# Patient Record
Sex: MALE | Race: WHITE | NOT HISPANIC OR LATINO | Employment: STUDENT | URBAN - METROPOLITAN AREA
[De-identification: names, ages, dates, MRNs, and addresses within clinical notes are randomized per-mention and may not be internally consistent; named-entity substitution may affect disease eponyms.]

---
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
## 2022-01-08 ENCOUNTER — HEALTH MAINTENANCE LETTER (OUTPATIENT)
Age: 22
End: 2022-01-08

## 2022-01-18 ENCOUNTER — PRE VISIT (OUTPATIENT)
Dept: ORTHOPEDICS | Facility: CLINIC | Age: 22
End: 2022-01-18

## 2022-01-20 ENCOUNTER — MYC MEDICAL ADVICE (OUTPATIENT)
Dept: DERMATOLOGY | Facility: CLINIC | Age: 22
End: 2022-01-20

## 2022-02-09 ENCOUNTER — TELEPHONE (OUTPATIENT)
Dept: DERMATOLOGY | Facility: CLINIC | Age: 22
End: 2022-02-09
Payer: COMMERCIAL

## 2022-02-17 ENCOUNTER — MYC MEDICAL ADVICE (OUTPATIENT)
Dept: DERMATOLOGY | Facility: CLINIC | Age: 22
End: 2022-02-17

## 2022-02-17 ENCOUNTER — VIRTUAL VISIT (OUTPATIENT)
Dept: DERMATOLOGY | Facility: CLINIC | Age: 22
End: 2022-02-17
Payer: COMMERCIAL

## 2022-02-17 DIAGNOSIS — L70.0 ACNE VULGARIS: ICD-10-CM

## 2022-02-17 DIAGNOSIS — Z79.899 ON ISOTRETINOIN THERAPY: Primary | ICD-10-CM

## 2022-02-17 PROCEDURE — 99214 OFFICE O/P EST MOD 30 MIN: CPT | Mod: GT

## 2022-02-17 RX ORDER — ISOTRETINOIN 40 MG/1
40 CAPSULE ORAL 2 TIMES DAILY
Qty: 60 CAPSULE | Refills: 0 | Status: SHIPPED | OUTPATIENT
Start: 2022-02-17 | End: 2022-03-17

## 2022-02-17 ASSESSMENT — PAIN SCALES - GENERAL: PAINLEVEL: NO PAIN (0)

## 2022-02-17 NOTE — PROGRESS NOTES
[facility] Dermatology Note  Encounter Date: Feb 17, 2022  [employer] and Telephone ( [phone] ). Location of teledermatologist: [facility] DERMATOLOGY CLINIC [place].  Start time: 8:10AM. End time: 8:12AM.    Dermatology Problem List:  # Acne vulgaris  - Current treatment: isotretinoin 80mg daily  - Past treatment: doxycycline 100mg BID, tretinoin 0.025% cream at bedtime, BP wash    ____________________________________________    Assessment & Plan:   # Acne vulgaris, improving  Tolerating isotretinoin 80mg. Discussion of the risks and side effects of isotretinoin including but not limited to mucocutaneous dryness, arthralgias, myalgias, depression, suicidal ideation, headache, blurred vision, GI upset, increase in liver enzymes, increase in lipids, and teratogenic effects was reviewed. Patient counseled they cannot give blood while on isotretinoin or share medication. Ipledge program consent is on file.     Prescriber: Pepe Malone MD  REMS ID: [id number]    Current dose: 80mg daily  Goal dose (220mg/kg x 84kg): 20518ya  Cumulative dose: 3600mg    - Safety labs today CBC, CMP, LP  - Patient confirmed today in iPledge by writer      Procedures Performed:    None    Follow-up: as scheduled     Staff and Resident:  Adolfo Malone MD  PGY-2 Dermatology  Pager: [phone]    Staff Physician Comments:   I evaluated any available patient photographs with the resident and I edited the assessment and plan as documented in the note. I was present on the line and participated in the entire telephone call.    Nitish Mata MD   of Dermatology  Department of Dermatology  [facility] [employer]    ____________________________________________    CC: Accutane (Will is here for a follow up for accutane, reports improvement of acne. Only noticing some headaches with the med)    HPI:Accutane  Mr. Felice Winston is a(n) 22 year old male who  presents today as a return patient for acne.   - Skin is significantly better  - Tolerating isotretinoin 80mg daily  - Was noticing some mild headache that resolved with hydration, tolerable dryness. No vision changes or nosebleeds. Denies arthralgias, depression, suicidal ideation.  - CeraVe lotion as needed, Aquaphor for lips.   - Patient is otherwise feeling well, without additional skin concerns.   - Spring break, march 6-13th, wonders about sun protection and EtOH use    Labs Reviewed:  N/A    Physical Exam:  Vitals: There were no vitals taken for this visit.  SKIN: Teledermatology photos were reviewed; image quality and interpretability: acceptable. Image date: 01/27/22.  - Scattered erythematous acneiform papules on bilateral malar cheeks and forehead admixed atophic scarring, mild xerosis of lips.   - No other lesions of concern on areas examined.                         Medications:  Current Outpatient Medications   Medication     diclofenac (VOLTAREN) 75 MG EC tablet     ISOtretinoin (ACCUTANE) 40 MG capsule     ISOtretinoin (ACCUTANE) 40 MG capsule     meloxicam (MOBIC) 15 MG tablet     tretinoin (RETIN-A) 0.025 % external cream     No current facility-administered medications for this visit.      Past Medical/Surgical History:   There is no problem list on file for this patient.    No past medical history on file.    CC F=Referred Self  No address on file on close of this encounter.

## 2022-02-17 NOTE — LETTER
Date:February 18, 2022      Provider requested that no letter be sent. Do not send.       [facility]

## 2022-02-17 NOTE — NURSING NOTE
Dermatology Rooming Note    Felice Winston's goals for this visit include:   Chief Complaint   Patient presents with     Accutane     Will is here for a follow up for accutane, reports improvement of acne. Only noticing some headaches with the med   Margaux Romero, EMT

## 2022-02-17 NOTE — LETTER
2/17/2022       RE: Felice Winston   [facility] [address]     Dear Colleague,    Thank you for referring your patient, Felice Winston, to the [facility] [place] at [facility]. Please see a copy of my visit note below.    [facility] Dermatology Note  Encounter Date: Feb 17, 2022  Store-and-Forward and Telephone ( [phone] ). Location of teledermatologist: [facility] [place].  Start time: 8:10AM. End time: 8:12AM.    Dermatology Problem List:  # Acne vulgaris  - Current treatment: isotretinoin 80mg daily  - Past treatment: doxycycline 100mg BID, tretinoin 0.025% cream at bedtime, BP wash    ____________________________________________    Assessment & Plan:   # Acne vulgaris, improving  Tolerating isotretinoin 80mg. Discussion of the risks and side effects of isotretinoin including but not limited to mucocutaneous dryness, arthralgias, myalgias, depression, suicidal ideation, headache, blurred vision, GI upset, increase in liver enzymes, increase in lipids, and teratogenic effects was reviewed. Patient counseled they cannot give blood while on isotretinoin or share medication. Ipledge program consent is on file.     Prescriber: Pepe Malone MD  REMS ID: [id number]    Current dose: 80mg daily  Goal dose (220mg/kg x 84kg): [id number]  Cumulative dose: 3600mg    - Safety labs today CBC, CMP, LP  - Patient confirmed today in iPledge by writer      Procedures Performed:    None    Follow-up: as scheduled     Staff and Resident:  Adolfo Malone MD  PGY-2 Dermatology  Pager: [phone]    Staff Physician Comments:   I evaluated any available patient photographs with the resident and I edited the assessment and plan as documented in the note. I was present on the line and participated in the entire telephone call.    Nitish Mata MD   of Dermatology  Department  of Dermatology  [facility]    ____________________________________________    CC: Accutane (Will is here for a follow up for accutane, reports improvement of acne. Only noticing some headaches with the med)    HPI:Accutane  Mr. Felice Winston is a(n) 22 year old male who presents today as a return patient for acne.   - Skin is significantly better  - Tolerating isotretinoin 80mg daily  - Was noticing some mild headache that resolved with hydration, tolerable dryness. No vision changes or nosebleeds. Denies arthralgias, depression, suicidal ideation.  - CeraVe lotion as needed, Aquaphor for lips.   - Patient is otherwise feeling well, without additional skin concerns.   - Spring break, march 6-13th, wonders about sun protection and EtOH use    Labs Reviewed:  N/A    Physical Exam:  Vitals: There were no vitals taken for this visit.  SKIN: Teledermatology photos were reviewed; image quality and interpretability: acceptable. Image date: 01/27/22.  - Scattered erythematous acneiform papules on bilateral malar cheeks and forehead admixed atophic scarring, mild xerosis of lips.   - No other lesions of concern on areas examined.                         Medications:  Current Outpatient Medications   Medication     diclofenac (VOLTAREN) 75 MG EC tablet     ISOtretinoin (ACCUTANE) 40 MG capsule     ISOtretinoin (ACCUTANE) 40 MG capsule     meloxicam (MOBIC) 15 MG tablet     tretinoin (RETIN-A) 0.025 % external cream     No current facility-administered medications for this visit.      Past Medical/Surgical History:   There is no problem list on file for this patient.    No past medical history on file.    CC F=Referred Self  No address on file on close of this encounter.      Again, thank you for allowing me to participate in the care of your patient.      Sincerely,    Pepe Malone MD

## 2022-03-16 NOTE — PROGRESS NOTES
[facility] Dermatology Note  Encounter Date: Mar 17, 2022  Store-and-Forward and Telephone ( [phone] ). Location of teledermatologist: [facility] [place].  Start time: 9:15AM. End time: 9:17AM.    Dermatology Problem List:  # Acne vulgaris  - Current treatment: isotretinoin 80mg daily  - Past treatment: doxycycline 100mg BID, tretinoin 0.025% cream at bedtime, BP wash    ____________________________________________    Assessment & Plan:   # Acne vulgaris, improving  Tolerating isotretinoin 80mg. Discussion of the risks and side effects of isotretinoin including but not limited to mucocutaneous dryness, arthralgias, myalgias, depression, suicidal ideation, headache, blurred vision, GI upset, increase in liver enzymes, increase in lipids, and teratogenic effects was reviewed. Patient counseled they cannot give blood while on isotretinoin or share medication. Ipledge program consent is on file.     Prescriber: Pepe Malone MD  REMS ID: [id number]    Current dose: 80mg daily  Goal dose (220mg/kg x 84kg): [id number]  Cumulative dose: 6000mg    - Safety labs due CBC, CMP, LP  - Patient confirmed today in iPledge by writer      Procedures Performed:    None    Follow-up: as scheduled     Staff and Resident:  Adolfo Malone MD  PGY-2 Dermatology  Pager: [phone]    Staff Physician Comments:   I evaluated any available patient photographs with the resident and I edited the assessment and plan as documented in the note. I was present on the line and participated in the entire telephone call.    Nitish Mata MD   of Dermatology  Department of Dermatology  [facility] [employer] [facility]    ____________________________________________    CC: Accutane (Felice has been on Accutane since the beginning of January and said things have been going very well so far.)    HPI:Accutane  Mr. Felice Winston is a(n) 22 year old male who presents  today as a return patient for acne.   - Skin is doing better  - Took a week off of isotretinoin while in [place], now back on   - Tolerating isotretinoin 80mg daily  - Headaches resolved, tolerable dryness. No vision changes or nosebleeds. Denies arthralgias, depression, suicidal ideation.  - CeraVe lotion as needed, Aquaphor for lips.   - Patient is otherwise feeling well, without additional skin concerns.       Labs Reviewed:  N/A        Physical Exam:  Vitals: There were no vitals taken for this visit.  SKIN: Teledermatology photos were reviewed; image quality and interpretability: acceptable. Image date: 01/27/22.  - Scattered erythematous acneiform papules on bilateral malar cheeks and forehead admixed atophic scarring, mild xerosis of lips.   - No other lesions of concern on areas examined.                       Medications:  Current Outpatient Medications   Medication     ISOtretinoin (ACCUTANE) 40 MG capsule     meloxicam (MOBIC) 15 MG tablet     diclofenac (VOLTAREN) 75 MG EC tablet     tretinoin (RETIN-A) 0.025 % external cream     No current facility-administered medications for this visit.      Past Medical/Surgical History:   There is no problem list on file for this patient.    No past medical history on file.    CC F=Referred Self  No address on file on close of this encounter.

## 2022-03-17 ENCOUNTER — VIRTUAL VISIT (OUTPATIENT)
Dept: DERMATOLOGY | Facility: CLINIC | Age: 22
End: 2022-03-17
Payer: COMMERCIAL

## 2022-03-17 DIAGNOSIS — L70.0 ACNE VULGARIS: ICD-10-CM

## 2022-03-17 DIAGNOSIS — Z79.899 ON ISOTRETINOIN THERAPY: Primary | ICD-10-CM

## 2022-03-17 PROCEDURE — 99214 OFFICE O/P EST MOD 30 MIN: CPT | Mod: TEL

## 2022-03-17 RX ORDER — ISOTRETINOIN 40 MG/1
40 CAPSULE ORAL 2 TIMES DAILY
Qty: 60 CAPSULE | Refills: 0 | Status: SHIPPED | OUTPATIENT
Start: 2022-03-17 | End: 2022-05-18

## 2022-03-17 NOTE — NURSING NOTE
Chief Complaint   Patient presents with     Accutane     Will has been on Accutane since the beginning of January and said things have been going very well so far.     Srinivas Case, Paramedic

## 2022-03-17 NOTE — PATIENT INSTRUCTIONS
Please call [phone] to schedule your lab appointment at [facility].

## 2022-03-17 NOTE — LETTER
Date:March 17, 2022      Provider requested that no letter be sent. Do not send.       [facility]

## 2022-03-17 NOTE — LETTER
3/17/2022       RE: Felice Winston  [address] [place] [address]     Dear Colleague,    Thank you for referring your patient, Felice Winston, to the [facility] [place] at [facility]. Please see a copy of my visit note below.    [facility] Dermatology Note  Encounter Date: Mar 17, 2022  Store-and-Forward and Telephone ( [phone] ). Location of teledermatologist: [facility] [place].  Start time: 9:15AM. End time: 9:17AM.    Dermatology Problem List:  # Acne vulgaris  - Current treatment: isotretinoin 80mg daily  - Past treatment: doxycycline 100mg BID, tretinoin 0.025% cream at bedtime, BP wash    ____________________________________________    Assessment & Plan:   # Acne vulgaris, improving  Tolerating isotretinoin 80mg. Discussion of the risks and side effects of isotretinoin including but not limited to mucocutaneous dryness, arthralgias, myalgias, depression, suicidal ideation, headache, blurred vision, GI upset, increase in liver enzymes, increase in lipids, and teratogenic effects was reviewed. Patient counseled they cannot give blood while on isotretinoin or share medication. Ipledge program consent is on file.     Prescriber: Pepe Malone MD  REMS ID: [id number]    Current dose: 80mg daily  Goal dose (220mg/kg x 84kg): [id number]  Cumulative dose: 6000mg    - Safety labs due CBC, CMP, LP  - Patient confirmed today in iPledge by writer      Procedures Performed:    None    Follow-up: as scheduled     Staff and Resident:  Adolfo Malone MD  PGY-2 Dermatology  Pager: [phone]    Staff Physician Comments:   I evaluated any available patient photographs with the resident and I edited the assessment and plan as documented in the note. I was present on the line and participated in the entire telephone call.    Nitish Mata MD   of  Dermatology  Department of Dermatology  [facility]    ____________________________________________    CC: Accutane (Felice has been on Accutane since the beginning of January and said things have been going very well so far.)    HPI:Accutane  Mr. Felice Winsotn is a(n) 22 year old male who presents today as a return patient for acne.   - Skin is doing better  - Took a week off of isotretinoin while in [place], now back on   - Tolerating isotretinoin 80mg daily  - Headaches resolved, tolerable dryness. No vision changes or nosebleeds. Denies arthralgias, depression, suicidal ideation.  - CeraVe lotion as needed, Aquaphor for lips.   - Patient is otherwise feeling well, without additional skin concerns.       Labs Reviewed:  N/A        Physical Exam:  Vitals: There were no vitals taken for this visit.  SKIN: Teledermatology photos were reviewed; image quality and interpretability: acceptable. Image date: 01/27/22.  - Scattered erythematous acneiform papules on bilateral malar cheeks and forehead admixed atophic scarring, mild xerosis of lips.   - No other lesions of concern on areas examined.                       Medications:  Current Outpatient Medications   Medication     ISOtretinoin (ACCUTANE) 40 MG capsule     meloxicam (MOBIC) 15 MG tablet     diclofenac (VOLTAREN) 75 MG EC tablet     tretinoin (RETIN-A) 0.025 % external cream     No current facility-administered medications for this visit.      Past Medical/Surgical History:   There is no problem list on file for this patient.    No past medical history on file.    CC F=Referred Self  No address on file on close of this encounter.      Again, thank you for allowing me to participate in the care of your patient.      Sincerely,    Pepe Malone MD

## 2022-05-03 ENCOUNTER — LAB (OUTPATIENT)
Dept: LAB | Facility: CLINIC | Age: 22
End: 2022-05-03
Payer: COMMERCIAL

## 2022-05-03 DIAGNOSIS — Z79.899 ON ISOTRETINOIN THERAPY: ICD-10-CM

## 2022-05-03 DIAGNOSIS — L70.0 ACNE VULGARIS: ICD-10-CM

## 2022-05-03 LAB
ALBUMIN SERPL-MCNC: 4.3 G/DL (ref 3.4–5)
ALP SERPL-CCNC: 82 U/L (ref 40–150)
ALT SERPL W P-5'-P-CCNC: 36 U/L (ref 0–70)
ANION GAP SERPL CALCULATED.3IONS-SCNC: 6 MMOL/L (ref 3–14)
AST SERPL W P-5'-P-CCNC: 38 U/L (ref 0–45)
BASOPHILS # BLD AUTO: 0 10E3/UL (ref 0–0.2)
BASOPHILS NFR BLD AUTO: 1 %
BILIRUB DIRECT SERPL-MCNC: <0.1 MG/DL (ref 0–0.2)
BILIRUB SERPL-MCNC: 0.5 MG/DL (ref 0.2–1.3)
BUN SERPL-MCNC: 16 MG/DL (ref 7–30)
CALCIUM SERPL-MCNC: 9.3 MG/DL (ref 8.5–10.1)
CHLORIDE BLD-SCNC: 102 MMOL/L (ref 94–109)
CHOLEST SERPL-MCNC: 239 MG/DL
CO2 SERPL-SCNC: 31 MMOL/L (ref 20–32)
CREAT SERPL-MCNC: 0.99 MG/DL (ref 0.66–1.25)
EOSINOPHIL # BLD AUTO: 0.5 10E3/UL (ref 0–0.7)
EOSINOPHIL NFR BLD AUTO: 8 %
ERYTHROCYTE [DISTWIDTH] IN BLOOD BY AUTOMATED COUNT: 12.3 % (ref 10–15)
FASTING STATUS PATIENT QL REPORTED: ABNORMAL
GFR SERPL CREATININE-BSD FRML MDRD: >90 ML/MIN/1.73M2
GLUCOSE BLD-MCNC: 94 MG/DL (ref 70–99)
HCT VFR BLD AUTO: 45 % (ref 40–53)
HDLC SERPL-MCNC: 53 MG/DL
HGB BLD-MCNC: 15 G/DL (ref 13.3–17.7)
IMM GRANULOCYTES # BLD: 0 10E3/UL
IMM GRANULOCYTES NFR BLD: 0 %
LDLC SERPL CALC-MCNC: 160 MG/DL
LYMPHOCYTES # BLD AUTO: 2.2 10E3/UL (ref 0.8–5.3)
LYMPHOCYTES NFR BLD AUTO: 32 %
MCH RBC QN AUTO: 30 PG (ref 26.5–33)
MCHC RBC AUTO-ENTMCNC: 33.3 G/DL (ref 31.5–36.5)
MCV RBC AUTO: 90 FL (ref 78–100)
MONOCYTES # BLD AUTO: 0.6 10E3/UL (ref 0–1.3)
MONOCYTES NFR BLD AUTO: 9 %
NEUTROPHILS # BLD AUTO: 3.5 10E3/UL (ref 1.6–8.3)
NEUTROPHILS NFR BLD AUTO: 50 %
NONHDLC SERPL-MCNC: 186 MG/DL
NRBC # BLD AUTO: 0 10E3/UL
NRBC BLD AUTO-RTO: 0 /100
PLATELET # BLD AUTO: 269 10E3/UL (ref 150–450)
POTASSIUM BLD-SCNC: 4.3 MMOL/L (ref 3.4–5.3)
PROT SERPL-MCNC: 8.1 G/DL (ref 6.8–8.8)
RBC # BLD AUTO: 5 10E6/UL (ref 4.4–5.9)
SODIUM SERPL-SCNC: 139 MMOL/L (ref 133–144)
TRIGL SERPL-MCNC: 128 MG/DL
WBC # BLD AUTO: 6.9 10E3/UL (ref 4–11)

## 2022-05-03 PROCEDURE — 82248 BILIRUBIN DIRECT: CPT | Performed by: PATHOLOGY

## 2022-05-03 PROCEDURE — 85025 COMPLETE CBC W/AUTO DIFF WBC: CPT | Performed by: PATHOLOGY

## 2022-05-03 PROCEDURE — 80053 COMPREHEN METABOLIC PANEL: CPT | Performed by: PATHOLOGY

## 2022-05-03 PROCEDURE — 80061 LIPID PANEL: CPT | Performed by: PATHOLOGY

## 2022-05-03 PROCEDURE — 36415 COLL VENOUS BLD VENIPUNCTURE: CPT | Performed by: PATHOLOGY

## 2022-05-11 ENCOUNTER — MYC MEDICAL ADVICE (OUTPATIENT)
Dept: DERMATOLOGY | Facility: CLINIC | Age: 22
End: 2022-05-11
Payer: COMMERCIAL

## 2022-05-18 ENCOUNTER — VIRTUAL VISIT (OUTPATIENT)
Dept: DERMATOLOGY | Facility: CLINIC | Age: 22
End: 2022-05-18
Payer: COMMERCIAL

## 2022-05-18 DIAGNOSIS — Z79.899 ON ISOTRETINOIN THERAPY: Primary | ICD-10-CM

## 2022-05-18 DIAGNOSIS — L70.0 ACNE VULGARIS: ICD-10-CM

## 2022-05-18 PROCEDURE — 99214 OFFICE O/P EST MOD 30 MIN: CPT | Mod: GQ | Performed by: PHYSICIAN ASSISTANT

## 2022-05-18 RX ORDER — ISOTRETINOIN 40 MG/1
80 CAPSULE ORAL
Qty: 60 CAPSULE | Refills: 0 | Status: SHIPPED | OUTPATIENT
Start: 2022-05-18 | End: 2022-06-22

## 2022-05-18 NOTE — LETTER
Date:May 19, 2022      Provider requested that no letter be sent. Do not send.       [facility]

## 2022-05-18 NOTE — PROGRESS NOTES
[facility] Dermatology Note  Encounter Date: May 18, 2022  Store-and-Forward and Telephone ([phone] ). Location of teledermatologist: [facility] [place].  Start time: 12:14 pm. End time: 12:20    Dermatology Problem List:  Acne vulgaris  - Current treatment: isotretinoin 80mg daily  REMS ID: [id number]  - Past treatment: doxycycline 100mg BID, tretinoin 0.025% cream at bedtime, BP wash       ____________________________________________    Assessment & Plan:     # # Acne vulgaris, improving  Tolerating isotretinoin 80mg. Discussion of the risks and side effects of isotretinoin including but not limited to mucocutaneous dryness, arthralgias, myalgias, depression, suicidal ideation, headache, blurred vision, GI upset, increase in liver enzymes, increase in lipids, and teratogenic effects was reviewed. Patient counseled they cannot give blood while on isotretinoin or share medication. Ipledge program consent is on file.     Current dose: 80mg daily  Goal dose (220mg/kg x 84kg): [id number]  Cumulative dose: 8,400mg Plan for approximately 4 more months.      - Safety labs reviewed from 5/3/22     # Sun protection was discussed and here recommended mineral sunscreens (zinc oxide and titanium dioxide) as well as sun protective clothing.     Procedures Performed:    None    Follow-up: 1 month(s) virtually (telephone with photos), or earlier for new or changing lesions    Staff:     All risks, benefits and alternatives were discussed with patient.  Patient is in agreement and understands the assessment and plan.  All questions were answered.  Sun Screen Education was given.   Return to Clinic in 1 month or sooner as needed.   Gabriella Gallegos PA-C   ____________________________________________    CC: Accutane    HPI:  Mr. Felice Winston is a(n) 22 year old male who presents today as a return patient for acne, on isotretinoin. He was last seen March 13 th when he was continued  on 80 mg of isotretinoin. He has been off of the medication for 2 months as he he missed his April appointment.  But during this time he has had only very mild acne without any significant flares.  He has a few acne on his forehead that are small but have been going away.    Denies headaches, visual changes, epistaxis, arthralgias, myalgias, abdominal pain, stool changes, hematochezia, mood changes, depression or suicidal ideations.    He has not been donating blood or sharing the medication with anybody.    Patient is otherwise feeling well, without additional skin concerns.    Labs Reviewed:  CBC RESULTS: Recent Labs   Lab Test 05/03/22  1144   WBC 6.9   RBC 5.00   HGB 15.0   HCT 45.0   MCV 90   MCH 30.0   MCHC 33.3   RDW 12.3        Last Comprehensive Metabolic Panel:  Sodium   Date Value Ref Range Status   05/03/2022 139 133 - 144 mmol/L Final     Potassium   Date Value Ref Range Status   05/03/2022 4.3 3.4 - 5.3 mmol/L Final     Chloride   Date Value Ref Range Status   05/03/2022 102 94 - 109 mmol/L Final     Carbon Dioxide (CO2)   Date Value Ref Range Status   05/03/2022 31 20 - 32 mmol/L Final     Anion Gap   Date Value Ref Range Status   05/03/2022 6 3 - 14 mmol/L Final     Glucose   Date Value Ref Range Status   05/03/2022 94 70 - 99 mg/dL Final     Urea Nitrogen   Date Value Ref Range Status   05/03/2022 16 7 - 30 mg/dL Final     Creatinine   Date Value Ref Range Status   05/03/2022 0.99 0.66 - 1.25 mg/dL Final     GFR Estimate   Date Value Ref Range Status   05/03/2022 >90 >60 mL/min/1.73m2 Final     Comment:     Effective December 21, 2021 eGFRcr in adults is calculated using the 2021 CKD-EPI creatinine equation which includes age and gender (Lazaro et al., NEJ, DOI: 10.1056/GWZFgy3059236)     Calcium   Date Value Ref Range Status   05/03/2022 9.3 8.5 - 10.1 mg/dL Final     Bilirubin Total   Date Value Ref Range Status   05/03/2022 0.5 0.2 - 1.3 mg/dL Final     Alkaline Phosphatase   Date Value  Ref Range Status   05/03/2022 82 40 - 150 U/L Final     ALT   Date Value Ref Range Status   05/03/2022 36 0 - 70 U/L Final     AST   Date Value Ref Range Status   05/03/2022 38 0 - 45 U/L Final       Recent Labs   Lab Test 05/03/22  1144 12/16/21  0956   CHOL 239* 220*   HDL 53 57   * 129*   TRIG 128 170*             Physical Exam:  Vitals: There were no vitals taken for this visit.  SKIN: Teledermatology photos were reviewed; image quality and interpretability: acceptable. Image date: 5/11/22  -There are a few resolving papules on the forehead.  - No other lesions of concern on areas examined.                     Medications:  Current Outpatient Medications   Medication     ISOtretinoin (ACCUTANE) 40 MG capsule     meloxicam (MOBIC) 15 MG tablet     diclofenac (VOLTAREN) 75 MG EC tablet     No current facility-administered medications for this visit.      Past Medical/Surgical History:   There is no problem list on file for this patient.    No past medical history on file.    CC F=Referred Self  No address on file on close of this encounter.

## 2022-05-18 NOTE — NURSING NOTE
Chief Complaint   Patient presents with     Accutane     Teledermatology Nurse Call Patients:     Are you in the [facility] at the time of the encounter? yes    Today's visit will be billed to you and your insurance.    A teledermatology visit is not as thorough as an in-person visit and the quality of the photograph sent may not be of the same quality as that taken by the dermatology clinic.

## 2022-05-18 NOTE — LETTER
5/18/2022       RE: Felice KENNEDY Tish  [address] [place] [address]     Dear Colleague,    Thank you for referring your patient, Felice Winston, to the [facility]ERMATOLOG[facility] [place] at [facility]. Please see a copy of my visit note below.    [facility] Dermatology Note  Encounter Date: May 18, 2022  Store-and-Forward and Telephone ([phone] ). Location of teledermatologist: [facility] [place].  Start time: 12:14 pm. End time: 12:20    Dermatology Problem List:  Acne vulgaris  - Current treatment: isotretinoin 80mg daily  REMS ID: [id number]  - Past treatment: doxycycline 100mg BID, tretinoin 0.025% cream at bedtime, BP wash       ____________________________________________    Assessment & Plan:     # # Acne vulgaris, improving  Tolerating isotretinoin 80mg. Discussion of the risks and side effects of isotretinoin including but not limited to mucocutaneous dryness, arthralgias, myalgias, depression, suicidal ideation, headache, blurred vision, GI upset, increase in liver enzymes, increase in lipids, and teratogenic effects was reviewed. Patient counseled they cannot give blood while on isotretinoin or share medication. Ipledge program consent is on file.     Current dose: 80mg daily  Goal dose (220mg/kg x 84kg): [id number]  Cumulative dose: 8,400mg Plan for approximately 4 more months.      - Safety labs reviewed from 5/3/22     # Sun protection was discussed and here recommended mineral sunscreens (zinc oxide and titanium dioxide) as well as sun protective clothing.     Procedures Performed:    None    Follow-up: 1 month(s) virtually (telephone with photos), or earlier for new or changing lesions    Staff:     All risks, benefits and alternatives were discussed with patient.  Patient is in agreement and understands the assessment and plan.  All questions were answered.  Sun Screen Education was  given.   Return to Clinic in 1 month or sooner as needed.   Gabriella Gallegos PA-C   ____________________________________________    CC: Accutane    HPI:  Mr. Felice Winston is a(n) 22 year old male who presents today as a return patient for acne, on isotretinoin. He was last seen March 13 th when he was continued on 80 mg of isotretinoin. He has been off of the medication for 2 months as he he missed his April appointment.  But during this time he has had only very mild acne without any significant flares.  He has a few acne on his forehead that are small but have been going away.    Denies headaches, visual changes, epistaxis, arthralgias, myalgias, abdominal pain, stool changes, hematochezia, mood changes, depression or suicidal ideations.    He has not been donating blood or sharing the medication with anybody.    Patient is otherwise feeling well, without additional skin concerns.    Labs Reviewed:  CBC RESULTS: Recent Labs   Lab Test 05/03/22  1144   WBC 6.9   RBC 5.00   HGB 15.0   HCT 45.0   MCV 90   MCH 30.0   MCHC 33.3   RDW 12.3        Last Comprehensive Metabolic Panel:  Sodium   Date Value Ref Range Status   05/03/2022 139 133 - 144 mmol/L Final     Potassium   Date Value Ref Range Status   05/03/2022 4.3 3.4 - 5.3 mmol/L Final     Chloride   Date Value Ref Range Status   05/03/2022 102 94 - 109 mmol/L Final     Carbon Dioxide (CO2)   Date Value Ref Range Status   05/03/2022 31 20 - 32 mmol/L Final     Anion Gap   Date Value Ref Range Status   05/03/2022 6 3 - 14 mmol/L Final     Glucose   Date Value Ref Range Status   05/03/2022 94 70 - 99 mg/dL Final     Urea Nitrogen   Date Value Ref Range Status   05/03/2022 16 7 - 30 mg/dL Final     Creatinine   Date Value Ref Range Status   05/03/2022 0.99 0.66 - 1.25 mg/dL Final     GFR Estimate   Date Value Ref Range Status   05/03/2022 >90 >60 mL/min/1.73m2 Final     Comment:     Effective December 21, 2021 eGFRcr in adults is calculated using the 2021  CKD-EPI creatinine equation which includes age and gender (Lazaro et al., NEJM, DOI: 10.1056/WJXZsf7727877)     Calcium   Date Value Ref Range Status   05/03/2022 9.3 8.5 - 10.1 mg/dL Final     Bilirubin Total   Date Value Ref Range Status   05/03/2022 0.5 0.2 - 1.3 mg/dL Final     Alkaline Phosphatase   Date Value Ref Range Status   05/03/2022 82 40 - 150 U/L Final     ALT   Date Value Ref Range Status   05/03/2022 36 0 - 70 U/L Final     AST   Date Value Ref Range Status   05/03/2022 38 0 - 45 U/L Final       Recent Labs   Lab Test 05/03/22  1144 12/16/21  0956   CHOL 239* 220*   HDL 53 57   * 129*   TRIG 128 170*             Physical Exam:  Vitals: There were no vitals taken for this visit.  SKIN: Teledermatology photos were reviewed; image quality and interpretability: acceptable. Image date: 5/11/22  -There are a few resolving papules on the forehead.  - No other lesions of concern on areas examined.                     Medications:  Current Outpatient Medications   Medication     ISOtretinoin (ACCUTANE) 40 MG capsule     meloxicam (MOBIC) 15 MG tablet     diclofenac (VOLTAREN) 75 MG EC tablet     No current facility-administered medications for this visit.      Past Medical/Surgical History:   There is no problem list on file for this patient.    No past medical history on file.    CC F=Referred Self  No address on file on close of this encounter.      Again, thank you for allowing me to participate in the care of your patient.      Sincerely,    Gabriella Gallegos PA-C

## 2022-06-22 ENCOUNTER — VIRTUAL VISIT (OUTPATIENT)
Dept: DERMATOLOGY | Facility: CLINIC | Age: 22
End: 2022-06-22
Payer: COMMERCIAL

## 2022-06-22 DIAGNOSIS — L70.0 ACNE VULGARIS: ICD-10-CM

## 2022-06-22 DIAGNOSIS — Z79.899 ON ISOTRETINOIN THERAPY: ICD-10-CM

## 2022-06-22 PROCEDURE — 99214 OFFICE O/P EST MOD 30 MIN: CPT | Performed by: PHYSICIAN ASSISTANT

## 2022-06-22 RX ORDER — ISOTRETINOIN 40 MG/1
80 CAPSULE ORAL
Qty: 60 CAPSULE | Refills: 0 | Status: SHIPPED | OUTPATIENT
Start: 2022-06-22 | End: 2022-08-18

## 2022-06-22 NOTE — PROGRESS NOTES
[facility] Dermatology Note  Encounter Date: Jun 22, 2022  Store-and-Forward and Telephone ([phone]). Location of teledermatologist: [facility] DERMATOLOGY [facility]INIC [place].  Start time: ***. End time: ***.    Dermatology Problem List:  1. ***    ____________________________________________    Assessment & Plan:     # {Diagnosesderm:005956}.   {kkplans:996162}   - ***     # {Diagnosesderm:292360}.   {kkplans:782608}   - ***     Procedures Performed:    None    Follow-up: {kkfollowup:798072}    {kkstaffinvolved:986905}    ***  ____________________________________________    CC: Follow Up (Will, is here for a follow up appt)    HPI:  Mr. Felice Winston is a(n) 22 year old male who presents today {kknew/return:043535} for ***    Patient is otherwise feeling well, without additional skin concerns.    Labs Reviewed:  ***N/A    Physical Exam:  Vitals: There were no vitals taken for this visit.  SKIN: Teledermatology photos were reviewed; image quality and interpretability: ***acceptable. Image date: ***.  - ***  - No other lesions of concern on areas examined.     Medications:  Current Outpatient Medications   Medication     ISOtretinoin (ACCUTANE) 40 MG capsule     meloxicam (MOBIC) 15 MG tablet     diclofenac (VOLTAREN) 75 MG EC tablet     No current facility-administered medications for this visit.      Past Medical/Surgical History:   There is no problem list on file for this patient.    No past medical history on file.    CC Referred Self, MD  No address on file on close of this encounter.

## 2022-06-22 NOTE — PROGRESS NOTES
[facility] Dermatology Note  Encounter Date: Jun 22, 2022  Store-and-Forward and Telephone ([phone] ). Location of teledermatologist: [facility]ERMATOLOG[facility] [place].  Start time: 12:02 pm. End time:12:07    Dermatology Problem List:  Acne vulgaris  - Current treatment: isotretinoin 80mg daily  REMS ID: [id number]  - Past treatment: doxycycline 100mg BID, tretinoin 0.025% cream at bedtime, BP wash       ____________________________________________    Assessment & Plan:     # # Acne vulgaris, improving on isotretinoin.   Tolerating isotretinoin 80mg. Discussion of the risks and side effects of isotretinoin including but not limited to mucocutaneous dryness, arthralgias, myalgias, depression, suicidal ideation, headache, blurred vision, GI upset, increase in liver enzymes, increase in lipids, and teratogenic effects was reviewed. Patient counseled they cannot give blood while on isotretinoin or share medication. Ipledge program consent is on file.     Current dose: 80mg daily  Goal dose (220mg/kg x 84kg): 79629mo  Cumulative dose: 10,800 Plan for approximately 3 more months.      - Safety labs reviewed from 5/3/22     # Sun protection was discussed and here recommended mineral sunscreens (zinc oxide and titanium dioxide) as well as sun protective clothing.     Procedures Performed:    None    Follow-up: 1 month(s) virtually (telephone with photos), or earlier for new or changing lesions    Staff:     All risks, benefits and alternatives were discussed with patient.  Patient is in agreement and understands the assessment and plan.  All questions were answered.  Sun Screen Education was given.   Return to Clinic in 1 month or sooner as needed.   Gabriella Gallegos PA-C   ____________________________________________    CC: Follow Up (Will, is here for a follow up appt)    HPI:  Mr. Felice Winston is a(n) 22 year old male who presents today as a return patient for acne, on  isotretinoin. He was last seen by me on 5/18/22 when he was continued on 80 mg of isotretinoin.  He reports noting very minimal acne.  He is very happy with the improvement in his skin.  He is tolerating this well without known any notable side effects.  He has been diligent about applying sunscreen when he is outside.      Denies headaches, visual changes, epistaxis, arthralgias, myalgias, abdominal pain, stool changes, hematochezia, mood changes, depression or suicidal ideations.    He has not been donating blood or sharing the medication with anybody.    Patient is otherwise feeling well, without additional skin concerns.    Labs Reviewed:  CBC RESULTS: Recent Labs   Lab Test 05/03/22  1144   WBC 6.9   RBC 5.00   HGB 15.0   HCT 45.0   MCV 90   MCH 30.0   MCHC 33.3   RDW 12.3        Last Comprehensive Metabolic Panel:  Sodium   Date Value Ref Range Status   05/03/2022 139 133 - 144 mmol/L Final     Potassium   Date Value Ref Range Status   05/03/2022 4.3 3.4 - 5.3 mmol/L Final     Chloride   Date Value Ref Range Status   05/03/2022 102 94 - 109 mmol/L Final     Carbon Dioxide (CO2)   Date Value Ref Range Status   05/03/2022 31 20 - 32 mmol/L Final     Anion Gap   Date Value Ref Range Status   05/03/2022 6 3 - 14 mmol/L Final     Glucose   Date Value Ref Range Status   05/03/2022 94 70 - 99 mg/dL Final     Urea Nitrogen   Date Value Ref Range Status   05/03/2022 16 7 - 30 mg/dL Final     Creatinine   Date Value Ref Range Status   05/03/2022 0.99 0.66 - 1.25 mg/dL Final     GFR Estimate   Date Value Ref Range Status   05/03/2022 >90 >60 mL/min/1.73m2 Final     Comment:     Effective December 21, 2021 eGFRcr in adults is calculated using the 2021 CKD-EPI creatinine equation which includes age and gender (Lazaro bhatia al., NEJM, DOI: 10.1056/OGAMwr5462328)     Calcium   Date Value Ref Range Status   05/03/2022 9.3 8.5 - 10.1 mg/dL Final     Bilirubin Total   Date Value Ref Range Status   05/03/2022 0.5 0.2 - 1.3  mg/dL Final     Alkaline Phosphatase   Date Value Ref Range Status   05/03/2022 82 40 - 150 U/L Final     ALT   Date Value Ref Range Status   05/03/2022 36 0 - 70 U/L Final     AST   Date Value Ref Range Status   05/03/2022 38 0 - 45 U/L Final       Recent Labs   Lab Test 05/03/22  1144 12/16/21  0956   CHOL 239* 220*   HDL 53 57   * 129*   TRIG 128 170*             Physical Exam:  Vitals: There were no vitals taken for this visit.  SKIN: Teledermatology photos were reviewed; image quality and interpretability: acceptable. Image date: 6/22/22  -There is a small papule on the left forehead  - No other lesions of concern on areas examined.                         Medications:  Current Outpatient Medications   Medication     ISOtretinoin (ACCUTANE) 40 MG capsule     meloxicam (MOBIC) 15 MG tablet     diclofenac (VOLTAREN) 75 MG EC tablet     No current facility-administered medications for this visit.      Past Medical/Surgical History:   There is no problem list on file for this patient.    No past medical history on file.    CC F=Referred Self  No address on file on close of this encounter.

## 2022-06-22 NOTE — LETTER
6/22/2022       RE: Felice KENNEDY Tish  [address] [place] [address]     Dear Colleague,    Thank you for referring your patient, Felice Winston, to the [facility] DERMATOLOGY CLINIC [place] at [facility]. Please see a copy of my visit note below.    [facility] Dermatology Note  Encounter Date: Jun 22, 2022  Store-and-Forward and Telephone ([phone] ). Location of teledermatologist: [facility] DERMATOLOGY CL[facility]IC [place].  Start time: 12:02 pm. End time:12:07    Dermatology Problem List:  Acne vulgaris  - Current treatment: isotretinoin 80mg daily  REMS ID: [id number]  - Past treatment: doxycycline 100mg BID, tretinoin 0.025% cream at bedtime, BP wash       ____________________________________________    Assessment & Plan:     # # Acne vulgaris, improving on isotretinoin.   Tolerating isotretinoin 80mg. Discussion of the risks and side effects of isotretinoin including but not limited to mucocutaneous dryness, arthralgias, myalgias, depression, suicidal ideation, headache, blurred vision, GI upset, increase in liver enzymes, increase in lipids, and teratogenic effects was reviewed. Patient counseled they cannot give blood while on isotretinoin or share medication. Ipledge program consent is on file.     Current dose: 80mg daily  Goal dose (220mg/kg x 84kg): 75065on  Cumulative dose: 10,800 Plan for approximately 3 more months.      - Safety labs reviewed from 5/3/22     # Sun protection was discussed and here recommended mineral sunscreens (zinc oxide and titanium dioxide) as well as sun protective clothing.     Procedures Performed:    None    Follow-up: 1 month(s) virtually (telephone with photos), or earlier for new or changing lesions    Staff:     All risks, benefits and alternatives were discussed with patient.  Patient is in agreement and understands the assessment and plan.  All questions were answered.  Sun  Screen Education was given.   Return to Clinic in 1 month or sooner as needed.   Gabriella Gallegos PA-C   ____________________________________________    CC: Follow Up (Felice, is here for a follow up appt)    HPI:  Mr. Felice Winston is a(n) 22 year old male who presents today as a return patient for acne, on isotretinoin. He was last seen by me on 5/18/22 when he was continued on 80 mg of isotretinoin.  He reports noting very minimal acne.  He is very happy with the improvement in his skin.  He is tolerating this well without known any notable side effects.  He has been diligent about applying sunscreen when he is outside.      Denies headaches, visual changes, epistaxis, arthralgias, myalgias, abdominal pain, stool changes, hematochezia, mood changes, depression or suicidal ideations.    He has not been donating blood or sharing the medication with anybody.    Patient is otherwise feeling well, without additional skin concerns.    Labs Reviewed:  CBC RESULTS: Recent Labs   Lab Test 05/03/22  1144   WBC 6.9   RBC 5.00   HGB 15.0   HCT 45.0   MCV 90   MCH 30.0   MCHC 33.3   RDW 12.3        Last Comprehensive Metabolic Panel:  Sodium   Date Value Ref Range Status   05/03/2022 139 133 - 144 mmol/L Final     Potassium   Date Value Ref Range Status   05/03/2022 4.3 3.4 - 5.3 mmol/L Final     Chloride   Date Value Ref Range Status   05/03/2022 102 94 - 109 mmol/L Final     Carbon Dioxide (CO2)   Date Value Ref Range Status   05/03/2022 31 20 - 32 mmol/L Final     Anion Gap   Date Value Ref Range Status   05/03/2022 6 3 - 14 mmol/L Final     Glucose   Date Value Ref Range Status   05/03/2022 94 70 - 99 mg/dL Final     Urea Nitrogen   Date Value Ref Range Status   05/03/2022 16 7 - 30 mg/dL Final     Creatinine   Date Value Ref Range Status   05/03/2022 0.99 0.66 - 1.25 mg/dL Final     GFR Estimate   Date Value Ref Range Status   05/03/2022 >90 >60 mL/min/1.73m2 Final     Comment:     Effective December 21, 2021 eGFRcr  in adults is calculated using the 2021 CKD-EPI creatinine equation which includes age and gender (Lazaro et al., NE, DOI: 10.1056/KOJKrj6580740)     Calcium   Date Value Ref Range Status   05/03/2022 9.3 8.5 - 10.1 mg/dL Final     Bilirubin Total   Date Value Ref Range Status   05/03/2022 0.5 0.2 - 1.3 mg/dL Final     Alkaline Phosphatase   Date Value Ref Range Status   05/03/2022 82 40 - 150 U/L Final     ALT   Date Value Ref Range Status   05/03/2022 36 0 - 70 U/L Final     AST   Date Value Ref Range Status   05/03/2022 38 0 - 45 U/L Final       Recent Labs   Lab Test 05/03/22  1144 12/16/21  0956   CHOL 239* 220*   HDL 53 57   * 129*   TRIG 128 170*             Physical Exam:  Vitals: There were no vitals taken for this visit.  SKIN: Teledermatology photos were reviewed; image quality and interpretability: acceptable. Image date: 6/22/22  -There is a small papule on the left forehead  - No other lesions of concern on areas examined.                         Medications:  Current Outpatient Medications   Medication     ISOtretinoin (ACCUTANE) 40 MG capsule     meloxicam (MOBIC) 15 MG tablet     diclofenac (VOLTAREN) 75 MG EC tablet     No current facility-administered medications for this visit.      Past Medical/Surgical History:   There is no problem list on file for this patient.    No past medical history on file.    CC F=Referred Self  No address on file on close of this encounter.      Again, thank you for allowing me to participate in the care of your patient.      Sincerely,    Gabriella Gallegos PA-C

## 2022-06-22 NOTE — NURSING NOTE
Chief Complaint   Patient presents with     Follow Up     Will, is here for a follow up appt    Teledermatology Nurse Call Patients:     Are you in the [facility] at the time of the encounter? yes    Today's visit will be billed to you and your insurance.    A teledermatology visit is not as thorough as an in-person visit and the quality of the photograph sent may not be of the same quality as that taken by the dermatology clinic.  Rip Espinoza VF

## 2022-06-22 NOTE — LETTER
Date:June 22, 2022      Patient was self referred, no letter generated. Do not send.        [facility] Health Information

## 2022-08-12 DIAGNOSIS — L70.0 ACNE VULGARIS: ICD-10-CM

## 2022-08-12 DIAGNOSIS — Z79.899 ON ISOTRETINOIN THERAPY: ICD-10-CM

## 2022-08-16 ENCOUNTER — MYC MEDICAL ADVICE (OUTPATIENT)
Dept: DERMATOLOGY | Facility: CLINIC | Age: 22
End: 2022-08-16

## 2022-08-16 NOTE — TELEPHONE ENCOUNTER
Taz Rojas,  So, did he not  the script from my visit with him in June?  When was his last fill?    Thanks, Gabriella

## 2022-08-17 RX ORDER — ISOTRETINOIN 40 MG/1
80 CAPSULE ORAL
OUTPATIENT
Start: 2022-08-17

## 2022-08-17 NOTE — TELEPHONE ENCOUNTER
Patient scheduled for 8/18/2022 with No Macias PA-C for a virtual Accutane follow-up. Patient states he will send in photos prior to his appointment. States he had no other questions or concerns.     Bob Woo, EMT

## 2022-08-17 NOTE — TELEPHONE ENCOUNTER
Taz Rojas,  Thanks for the clarifications! You can certainly see if he can see Dr Malone earlier (or any other provider!) than his scheduled appointment with me 8/29. Otherwise he needs to be seen for refills.  Thanks, Gabriella

## 2022-08-17 NOTE — TELEPHONE ENCOUNTER
Zenatane 40 MG Oral Capsule  Last Written Prescription Date:   6/22/2022  Last Fill Quantity: 60,   # refills: 0  Last Office Visit :  6/22/2022  Future Office visit:   8/29/2022    Routing refill request to provider for review/approval because:  Drug not on the [facility], [facility] or [facility] refill protocol or controlled substance      Lexi Duque RN  Central Triage Red Flags/Med Refills

## 2022-08-18 ENCOUNTER — VIRTUAL VISIT (OUTPATIENT)
Dept: DERMATOLOGY | Facility: CLINIC | Age: 22
End: 2022-08-18
Payer: COMMERCIAL

## 2022-08-18 DIAGNOSIS — Z79.899 ON ISOTRETINOIN THERAPY: ICD-10-CM

## 2022-08-18 DIAGNOSIS — L70.0 ACNE VULGARIS: ICD-10-CM

## 2022-08-18 PROCEDURE — 99214 OFFICE O/P EST MOD 30 MIN: CPT | Mod: TEL | Performed by: PHYSICIAN ASSISTANT

## 2022-08-18 RX ORDER — ISOTRETINOIN 40 MG/1
80 CAPSULE ORAL
Qty: 60 CAPSULE | Refills: 0 | Status: SHIPPED | OUTPATIENT
Start: 2022-08-18 | End: 2022-09-26

## 2022-08-18 NOTE — LETTER
Date:August 19, 2022      Provider requested that no letter be sent. Do not send.       [employer]

## 2022-08-18 NOTE — NURSING NOTE
Dermatology Rooming Note    Felice Winston's goals for this visit include:   Chief Complaint   Patient presents with     Accutane     Will has a telehealth appointment to discuss his Accutane.  Says it's going great.  No current break outs.       Helen Reza, CMA

## 2022-08-18 NOTE — LETTER
8/18/2022       RE: Felice KENNEDY Tish  [address] [place] [address]     Dear Colleague,    Thank you for referring your patient, Felice Winston, to the [facility] DERMATOLOGY CLINIC [place] at [facility]. Please see a copy of my visit note below.    [facility] Dermatology Note  Encounter Date: Aug 18, 2022  Telephone ([phone]). Location of teledermatologist: [facility] DERMATOLOGY CLINIC [place]. Length of call: 6min    Dermatology Problem List:  1. Acne vulgaris  - Current treatment: isotretinoin 80mg daily  REMS ID: [id number]  - Past treatment: doxycycline 100mg BID, tretinoin 0.025% cream at bedtime, BP wash  ____________________________________________    Assessment & Plan:     # Acne vulgaris, improving on isotretinoin.   Tolerating isotretinoin 80mg. Discussion of the risks and side effects of isotretinoin including but not limited to mucocutaneous dryness, arthralgias, myalgias, depression, suicidal ideation, headache, blurred vision, GI upset, increase in liver enzymes, increase in lipids, and teratogenic effects was reviewed. Patient counseled they cannot give blood while on isotretinoin or share medication. Ipledge program consent is on file.     Current dose: 80mg daily  Goal dose (220mg/kg x 84kg): 97851my  Cumulative dose: 13,200mg. Plan for approximately 2 more months.      - Safety labs reviewed from 5/3/22     # Sun protection was discussed and here recommended mineral sunscreens (zinc oxide and titanium dioxide) as well as sun protective clothing.     Procedures Performed:    None    Follow-up: 1 month(s) virtually (telephone with photos), or earlier for new or changing lesions    Staff and Scribe:     Scribe Disclosure:  Valdemar EDWARDS, am serving as a scribe to document services personally performed by No Macias PA-C based on data collection and the provider's statements to me.   Provider  Disclosure:   The documentation recorded by the scribe accurately reflects the services I personally performed and the decisions made by me.    All risks, benefits and alternatives were discussed with patient.  Patient is in agreement and understands the assessment and plan.  All questions were answered.    No Macias PA-C, [facility]  [facility]: Phone: [phone], Fax: [phone]  [facility]: Phone: [phone],  Fax: [phone]  [facility]: Phone: [phone], Fax: [phone]    ____________________________________________    CC: No chief complaint on file.    HPI:  Mr. Felice Winston is a(n) 22 year old male who presents today as a return patient for isotretinoin follow up. Last seen virtually by Gabriella Gallegos PA-C, on 6/22/22, at which time patient was continued on isotretinoin 80 mg daily for treatment of acne vulgaris.    Today, he notes things are going really well. He spaced out his medication because he was gone for a while this summer. He last took a pill about 10-14 days ago. Notes no new break outs. The patient reports tolerable mucocutaneous dryness, and denies arthralgias, myalgias, depression, suicidal ideation, diarrhea, headache, or blurred vision.      Patient is otherwise feeling well, without additional skin concerns.    Labs Reviewed:  Reviewed safety labs from 5/3/22    Physical Exam:  Vitals: There were no vitals taken for this visit.  SKIN: Teledermatology photos were reviewed; image quality and interpretability: acceptable. Image date:8/16/22 .  - skin is clear, no active comedones or papules  - No other lesions of concern on areas examined.     Medications:  Current Outpatient Medications   Medication     diclofenac (VOLTAREN) 75 MG EC tablet     ISOtretinoin (ACCUTANE) 40 MG capsule     meloxicam (MOBIC) 15 MG tablet     No current facility-administered medications  for this visit.      Past Medical/Surgical History:   There is no problem list on file for this patient.    No past medical history on file.        Again, thank you for allowing me to participate in the care of your patient.      Sincerely,    No Macias PA-C

## 2022-08-18 NOTE — PROGRESS NOTES
[facility] Dermatology Note  Encounter Date: Aug 18, 2022  Telephone ([phone]). Location of teledermatologist: [facility] DERMATOLOGY CLINIC [place]. Length of call: 6min    Dermatology Problem List:  1. Acne vulgaris  - Current treatment: isotretinoin 80mg daily  REMS ID: [id number]  - Past treatment: doxycycline 100mg BID, tretinoin 0.025% cream at bedtime, BP wash  ____________________________________________    Assessment & Plan:     # Acne vulgaris, improving on isotretinoin.   Tolerating isotretinoin 80mg. Discussion of the risks and side effects of isotretinoin including but not limited to mucocutaneous dryness, arthralgias, myalgias, depression, suicidal ideation, headache, blurred vision, GI upset, increase in liver enzymes, increase in lipids, and teratogenic effects was reviewed. Patient counseled they cannot give blood while on isotretinoin or share medication. Ipledge program consent is on file.     Current dose: 80mg daily  Goal dose (220mg/kg x 84kg): 65844se  Cumulative dose: 13,200mg. Plan for approximately 2 more months.      - Safety labs reviewed from 5/3/22     # Sun protection was discussed and here recommended mineral sunscreens (zinc oxide and titanium dioxide) as well as sun protective clothing.     Procedures Performed:    None    Follow-up: 1 month(s) virtually (telephone with photos), or earlier for new or changing lesions    Staff and Scribe:     Scribe Disclosure:  I, Valdemar Nichole, am serving as a scribe to document services personally performed by No Macias PA-C based on data collection and the provider's statements to me.   Provider Disclosure:   The documentation recorded by the scribe accurately reflects the services I personally performed and the decisions made by me.    All risks, benefits and alternatives were discussed with patient.  Patient is in agreement and understands the assessment and plan.  All questions were answered.    No  CJ Macias, [facility]  [facility]: Phone: [phone], Fax: [phone]  [facility]: Phone: [phone],  Fax: [phone]  [facility]: Phone: [phone], Fax: [phone]    ____________________________________________    CC: No chief complaint on file.    HPI:  Mr. Felice Winston is a(n) 22 year old male who presents today as a return patient for isotretinoin follow up. Last seen virtually by Gabriella Gallegos PA-C, on 6/22/22, at which time patient was continued on isotretinoin 80 mg daily for treatment of acne vulgaris.    Today, he notes things are going really well. He spaced out his medication because he was gone for a while this summer. He last took a pill about 10-14 days ago. Notes no new break outs. The patient reports tolerable mucocutaneous dryness, and denies arthralgias, myalgias, depression, suicidal ideation, diarrhea, headache, or blurred vision.      Patient is otherwise feeling well, without additional skin concerns.    Labs Reviewed:  Reviewed safety labs from 5/3/22    Physical Exam:  Vitals: There were no vitals taken for this visit.  SKIN: Teledermatology photos were reviewed; image quality and interpretability: acceptable. Image date:8/16/22 .  - skin is clear, no active comedones or papules  - No other lesions of concern on areas examined.     Medications:  Current Outpatient Medications   Medication     diclofenac (VOLTAREN) 75 MG EC tablet     ISOtretinoin (ACCUTANE) 40 MG capsule     meloxicam (MOBIC) 15 MG tablet     No current facility-administered medications for this visit.      Past Medical/Surgical History:   There is no problem list on file for this patient.    No past medical history on file.

## 2022-09-26 ENCOUNTER — VIRTUAL VISIT (OUTPATIENT)
Dept: DERMATOLOGY | Facility: CLINIC | Age: 22
End: 2022-09-26
Payer: COMMERCIAL

## 2022-09-26 DIAGNOSIS — Z79.899 ON ISOTRETINOIN THERAPY: ICD-10-CM

## 2022-09-26 DIAGNOSIS — L70.0 ACNE VULGARIS: ICD-10-CM

## 2022-09-26 PROCEDURE — 99214 OFFICE O/P EST MOD 30 MIN: CPT | Performed by: PHYSICIAN ASSISTANT

## 2022-09-26 RX ORDER — ISOTRETINOIN 40 MG/1
80 CAPSULE ORAL
Qty: 60 CAPSULE | Refills: 0 | Status: SHIPPED | OUTPATIENT
Start: 2022-09-26

## 2022-09-26 ASSESSMENT — PAIN SCALES - GENERAL: PAINLEVEL: NO PAIN (0)

## 2022-09-26 NOTE — LETTER
9/26/2022       RE: Felice KENNEDY Tish  [address] [place] [address]     Dear Colleague,    Thank you for referring your patient, Felice Winston, to the [facility] DERMATOLOGY CLINIC [place] at [facility]. Please see a copy of my visit note below.    [facility] Dermatology Note  Encounter Date: Sep 26, 2022  Telephone ([phone] ). Location of teledermatologist: [facility] DERMATOLOGY CLINIC [place]. Start time: 12noon. End time: 12:04 pm    Dermatology Problem List:  1. Acne vulgaris  - Current treatment: isotretinoin 80mg daily  REMS ID: [id number]  - Past treatment: doxycycline 100mg BID, tretinoin 0.025% cream at bedtime, BP wash  ____________________________________________    Assessment & Plan:        # Acne vulgaris, improving on isotretinoin.   Tolerating isotretinoin 80mg. Discussion of the risks and side effects of isotretinoin including but not limited to mucocutaneous dryness, arthralgias, myalgias, depression, suicidal ideation, headache, blurred vision, GI upset, increase in liver enzymes, increase in lipids, and teratogenic effects was reviewed. Patient counseled they cannot give blood while on isotretinoin or share medication. Ipledge program consent is on file.     Current dose: 80mg daily  Goal dose (220mg/kg x 84kg): 15,600 mg.   Cumulative dose: 13,200mg. Plan for approximately 1 more months, last month.     Avoid donating blood for one month.    Avoid unnecessary surgical procedures: cosmetic, dental etc for at least 6 months.            - Safety labs reviewed from 5/3/22     # Sun protection was discussed and here recommended mineral sunscreens (zinc oxide and titanium dioxide) as well as sun protective clothing.     Procedures Performed:    None    Follow-up: 4 month(s) virtually (telephone with photos), or earlier for new or changing lesions    Staff and Scribe:      Scribe Disclosure:  I,  BERNY ROCHE, am serving as a scribe to document services personally performed by Gabriella Gallegos PA-C based on data collection and the provider's statements to me.     Provider Disclosure:   The documentation recorded by the scribe accurately reflects the services I personally performed and the decisions made by me.    All risks, benefits and alternatives were discussed with patient.  Patient is in agreement and understands the assessment and plan.  All questions were answered.  Sun Screen Education was given.   Return to Clinic in 4 month or sooner as needed.   Gabriella Gallegos PA-C   [facility] Dermatology Clinic   ____________________________________________    CC: Accutane (Going great.  No flare ups or concerns. )    HPI:  Mr. Felice Winston is a(n) 22 year old male who presents today as a return patient for acne. Last seen virtually by No Macias PA-C, at which time patient was continued on isotretinoin for treatment of acne vulgaris.     Today, he reports   Patient is otherwise feeling well, without additional skin concerns.    Labs Reviewed:  Reviewed Accutane labs from 5/3/22    Physical Exam:  Vitals: There were no vitals taken for this visit.  SKIN: Teledermatology photos were reviewed; image quality and interpretability: acceptable. Image date: 09/21/22  - Mild xerosis noted to the face without papules or pustules   - No other lesions of concern on areas examined.                     Medications:  Current Outpatient Medications   Medication     diclofenac (VOLTAREN) 75 MG EC tablet     ISOtretinoin (ACCUTANE) 40 MG capsule     meloxicam (MOBIC) 15 MG tablet     No current facility-administered medications for this visit.      Past Medical/Surgical History:   There is no problem list on file for this patient.    No past medical history on file.    CC Referred Self, MD  No address on file on close of this encounter.      Again, thank you for allowing me to participate in the care  of your patient.      Sincerely,    Gabriella Gallegos PA-C

## 2022-09-26 NOTE — NURSING NOTE
Dermatology Rooming Note    Felice Winston's goals for this visit include:   Chief Complaint   Patient presents with     Accutane     Going great.  No flare ups or concerns.      Helen Reza CMA

## 2022-09-26 NOTE — PATIENT INSTRUCTIONS
Pediatric Dermatology  [place] [address] [place] [id number]   [phone]   Isotretinoin/Accutane      What is Isotretinoin?     Isotretinoin, more commonly known by its former brand name of  Accutane , is an oral treatment for acne derived from Vitamin A. It is the most effective acne treatment available and often results in a long-term remission or  cure . It has been used since the 1980s in various formulations. It is used to treat moderate or severe acne, or acne that is causing scars.     How does isotretinoin work?  Decreases the size of oil glands and oil production   Prevents growth of acne-causing bacteria   Allows the skin cells to mature more normally   Reduces skin inflammation     How long do I need to take isotretinoin?     Patients typically take the medicine for 6-8 months until reaching a weight-based  goal dose . Some people may need to take isotretinoin longer depending on their response to treatment. Always take isotretinoin with food because it needs the help from dietary fat to be absorbed.     What is  iPledge ?     iPledge website: [email or web address]     Babies born to mothers taking isotretinoin can have birth defects. The medicine is therefore regulated by a national program called  iPledge . There is no risk of birth defects in babies born to males taking isotretinoin. The birth defect risk for females lasts for one month after stopping the medication. There is no impact on fertility.     Patients are registered in iPledge under one of two categories:  1.  Patients who can get pregnant : Patients with functional female reproductive organs   2.  Patients who cannot get pregnant : Patients without functional female reproductive organs and patients with male reproductive organs    All patients:   To qualify for a prescription for isotretinoin we will need to enroll you in the iPledge program   You cannot share your medication   You cannot donate blood  while taking isotretinoin and for one month after        Patients who can get pregnant:   You need to use two forms or birth control or be abstinent from sexual activity   Women need to take two pregnancy tests at least 30 days apart prior to starting isotretinoin, and then a pregnancy test monthly   Each month you need to log in to the iPledge website to answer comprehension questions showing that you know to avoid pregnancy while taking isotretinoin.   After your clinic visit you will only have 7 days to  your prescription at the pharmacy. If you miss the pick-up window you will need to take another pregnancy test.     Do I need blood work?   Because isotretinoin can rarely cause changes in liver tests and lipid levels you will need initial lab monitoring for safety. In most cases, blood work is needed at baseline, and after dose increases.      *Patients of childbearing potential are required per the iPledge system, to complete a pregnancy test each month. Your prescribing provider will discuss more details about this with you.      Lab testing:   Labs should be collected at an [facility] location when possible. If you prefer to have them collected at a non-[place] facility, please ensure that the results are faxed to our office at [phone]. Be aware there may be a delay in receiving results from outside clinics.      What are the side effects?   Almost everyone will have dry skin and lips when taking isotretinoin. Patients who wear contacts may especially notice more eye dryness. All side effects will stop when the isotretinoin is stopped. It s important to tell your doctor about side effects so that we can help to treat or prevent them.     Common:     Dryness: skin, eyes, nose, lips   Slight elevation of blood lipids (triglycerides)   Sun sensitivity   Skin fragility    Less common:   Headaches- contact clinic if severe and persistent   Muscle aches   Nausea   Nose bleeds   Decreased night  vision    Very rare:  Changes in liver enzymes   Very high triglycerides        Troubleshooting tips for common side effects:    Dry lips: Apply Vaseline or Aquaphor throughout the day and at bedtime.   Nosebleeds: Apply a small amount of Vaseline or Aquaphor just inside each nostril nightly at bedtime.   Dry skin: Use a mild gentle soap for bathing and a moisturizer daily. Avoid exfoliating the skin. Avoid acne washes.   Dry eyes: Use lubricating eye drops throughout the day. Decrease contact lens use.     What about mood changes?     You may have read that isotretinoin has been linked with mood changes, depression, and suicidality. A recent large study reviewing data linking isotretinoin and depression found no association (improvements in depression were actually noted). As this question has not been definitively answered we will continue to ask about your mood each month. Please stop the medication and call our clinic if you are noticing symptoms of increased sadness/depression. Seek immediate medical attention if you have thoughts of suicide or self-harm.     Commonly asked Questions:    Should I continue my other acne treatments while I take isotretinoin?   Please stop all other acne treatments. This includes oral antibiotics, acne creams, and acne washes. These may be too harsh for use with isotretinoin, causing extra skin dryness.     Females should continue birth control pills while on isotretinoin unless instructed to stop them.     What if I have problems getting my medicine at the pharmacy?  If you are not able to obtain your medicine from the pharmacy, please contact our clinic as soon as possible.     What if I missed my appointment?  We cannot dispense an isotretinoin refill if you have not been seen. Please contact the nursing line to coordinate an appointment.     What should I do if I forgot to take my medication?  It is ok to take a double dose the following day. If you have missed several days of  medicine, notify your provider at your next appointment to make a plan.    What if I m going to run out of medicine before my next appointment?  Going without the medicine for several days is not a concern. The medicine works in the long-term so this will not be a setback.     Contact info:  If you have any questions or concerns about your isotretinoin, please call the Division of Pediatric Dermatology at [facility] at *[phone]* during clinic hours. If you have questions or concerns over the weekend, a holiday or after clinic hours please call *[phone]* and ask for the Dermatology Resident on-call to be paged.

## 2022-09-26 NOTE — LETTER
Date:September 26, 2022      Patient was self referred, no letter generated. Do not send.        [facility] Health Information

## 2022-09-26 NOTE — PROGRESS NOTES
[facility] Dermatology Note  Encounter Date: Sep 26, 2022  Telephone ([phone] ). Location of teledermatologist: [facility] DERMATOLOGY CLINIC [place]. Start time: 12noon. End time: 12:04 pm    Dermatology Problem List:  1. Acne vulgaris  - Current treatment: isotretinoin 80mg daily  REMS ID: [id number]  - Past treatment: doxycycline 100mg BID, tretinoin 0.025% cream at bedtime, BP wash  ____________________________________________    Assessment & Plan:        # Acne vulgaris, improving on isotretinoin.   Tolerating isotretinoin 80mg. Discussion of the risks and side effects of isotretinoin including but not limited to mucocutaneous dryness, arthralgias, myalgias, depression, suicidal ideation, headache, blurred vision, GI upset, increase in liver enzymes, increase in lipids, and teratogenic effects was reviewed. Patient counseled they cannot give blood while on isotretinoin or share medication. Ipledge program consent is on file.     Current dose: 80mg daily  Goal dose (220mg/kg x 84kg): 15,600 mg.   Cumulative dose: 13,200mg. Plan for approximately 1 more months, last month.     Avoid donating blood for one month.    Avoid unnecessary surgical procedures: cosmetic, dental etc for at least 6 months.            - Safety labs reviewed from 5/3/22     # Sun protection was discussed and here recommended mineral sunscreens (zinc oxide and titanium dioxide) as well as sun protective clothing.     Procedures Performed:    None    Follow-up: 4 month(s) virtually (telephone with photos), or earlier for new or changing lesions    Staff and Scribe:      Scribe Disclosure:  I, BERNY ROCHE, am serving as a scribe to document services personally performed by Gabriella Gallegos PA-C based on data collection and the provider's statements to me.     Provider Disclosure:   The documentation recorded by the scribe accurately reflects the services I personally performed and the decisions made  by me.    All risks, benefits and alternatives were discussed with patient.  Patient is in agreement and understands the assessment and plan.  All questions were answered.  Sun Screen Education was given.   Return to Clinic in 4 month or sooner as needed.   Gabriella Gallegos PA-C   [facility] Dermatology Clinic   ____________________________________________    CC: Accutane (Going great.  No flare ups or concerns. )    HPI:  Mr. Felice Winston is a(n) 22 year old male who presents today as a return patient for acne. Last seen virtually by No Macias PA-C, at which time patient was continued on isotretinoin for treatment of acne vulgaris.     Today, he reports   Patient is otherwise feeling well, without additional skin concerns.    Labs Reviewed:  Reviewed Accutane labs from 5/3/22    Physical Exam:  Vitals: There were no vitals taken for this visit.  SKIN: Teledermatology photos were reviewed; image quality and interpretability: acceptable. Image date: 09/21/22  - Mild xerosis noted to the face without papules or pustules   - No other lesions of concern on areas examined.                     Medications:  Current Outpatient Medications   Medication     diclofenac (VOLTAREN) 75 MG EC tablet     ISOtretinoin (ACCUTANE) 40 MG capsule     meloxicam (MOBIC) 15 MG tablet     No current facility-administered medications for this visit.      Past Medical/Surgical History:   There is no problem list on file for this patient.    No past medical history on file.    CC Referred Self, MD  No address on file on close of this encounter.

## 2022-09-27 ENCOUNTER — PATIENT OUTREACH (OUTPATIENT)
Dept: DERMATOLOGY | Facility: CLINIC | Age: 22
End: 2022-09-27

## 2022-09-27 NOTE — TELEPHONE ENCOUNTER
Attempted to reach patient to schedule follow up in the Dermatology Clinic.  No answer,  LM on VM to call office and [employer] message sent..    Schedule with Gabriella Gallegos PA-C -phone.

## 2022-10-19 ENCOUNTER — E-VISIT (OUTPATIENT)
Dept: URGENT CARE | Facility: CLINIC | Age: 22
End: 2022-10-19
Payer: COMMERCIAL

## 2022-10-19 DIAGNOSIS — R21 RASH: Primary | ICD-10-CM

## 2022-10-19 PROCEDURE — 99207 PR NON-BILLABLE SERV PER CHARTING: CPT | Performed by: PHYSICIAN ASSISTANT

## 2022-10-19 NOTE — PATIENT INSTRUCTIONS
Dear Felice Winston,    We are sorry you are not feeling well. Based on the responses you provided, it is recommended that you be seen in-person in urgent care so we can better evaluate your symptoms. Please click here to find the nearest urgent care location to you.   You will not be charged for this Visit. Thank you for trusting us with your care.    Jenna Rosenthal PA-C

## 2022-10-21 ENCOUNTER — OFFICE VISIT (OUTPATIENT)
Dept: FAMILY MEDICINE | Facility: CLINIC | Age: 22
End: 2022-10-21
Payer: COMMERCIAL

## 2022-10-21 VITALS
DIASTOLIC BLOOD PRESSURE: 62 MMHG | SYSTOLIC BLOOD PRESSURE: 100 MMHG | RESPIRATION RATE: 16 BRPM | BODY MASS INDEX: 25.23 KG/M2 | HEART RATE: 61 BPM | OXYGEN SATURATION: 97 % | WEIGHT: 186 LBS | TEMPERATURE: 97.8 F

## 2022-10-21 DIAGNOSIS — B35.4 TINEA CORPORIS: Primary | ICD-10-CM

## 2022-10-21 PROCEDURE — 99203 OFFICE O/P NEW LOW 30 MIN: CPT | Performed by: FAMILY MEDICINE

## 2022-10-21 RX ORDER — PAROXETINE 10 MG/1
TABLET, FILM COATED ORAL
COMMUNITY
Start: 2022-01-11

## 2022-10-21 RX ORDER — FLUCONAZOLE 200 MG/1
200 TABLET ORAL WEEKLY
Qty: 4 TABLET | Refills: 0 | Status: SHIPPED | OUTPATIENT
Start: 2022-10-21 | End: 2022-11-12

## 2022-10-21 NOTE — PROGRESS NOTES
Patient presents with:  Derm Problem: Ring worm upper limbs and left leg x 2 weeks        Subjective     Will MARCIA Winston is a 22 year old male who presents to clinic today for the following health issues:    HPI     Rash      Duration:  2 weeks    Description  Location: dorsum right lower forearm, left medial epicondyle   Left mid lateral thigh   Itching: no    Intensity:  moderate    Accompanying signs and symptoms: none     History (similar episodes/previous evaluation): Similar patches in the past resolved with clotrimazole    Precipitating or alleviating factors:  New exposures: Worked  doing "[employer]ing, possible exposure to fungus in soil   Recent travel: no      Therapies tried and outcome: Topical antifungal        No past medical history on file.  Social History     Tobacco Use     Smoking status: Never     Smokeless tobacco: Never   Substance Use Topics     Alcohol use: Yes     Comment: occasional       Current Outpatient Medications   Medication Sig Dispense Refill     fluconazole (DIFLUCAN) 200 MG tablet Take 1 tablet (200 mg) by mouth once a week for 4 doses 4 tablet 0     ISOtretinoin (ACCUTANE) 40 MG capsule Take 2 capsules (80 mg) by mouth daily with food 60 capsule 0     PARoxetine (PAXIL) 10 MG tablet        Allergies   Allergen Reactions     Seasonal Allergies Unknown     Sulfa Drugs Itching             ROS are negative, except as otherwise noted HPI      Objective    /62 (BP Location: Right arm, Patient Position: Sitting, Cuff Size: Adult Large)   Pulse 61   Temp 97.8  F (36.6  C) (Oral)   Resp 16   Wt 84.4 kg (186 lb)   SpO2 97%   BMI 25.23 kg/m    Body mass index is 25.23 kg/m .  Physical Exam   GENERAL: healthy, alert and no distress  MS: no gross musculoskeletal defects noted, no edema  SKIN: Small circular patches  with raised red borders and central clearing on dorsum of right forearm, left medial epicondyle and left mid lateral  thigh  NEURO: Normal strength and tone, mentation  intact and speech normal, normal gait      Diagnostic Test Results:  Labs reviewed in Epic  No results found for any visits on 10/21/22.        ASSESSMENT/PLAN:      ICD-10-CM    1. Tinea corporis  B35.4 fluconazole (DIFLUCAN) 200 MG tablet            Reviewed medication instructions and side effects. Follow up if experiences side effects.     I reviewed supportive care, otc meds to use if needed, expected course, and signs of concern.  Follow up as needed or if he does not improve within  1-2 days or if worsens in any way.  Reviewed red flag symptoms and is to go to the ER if experiences any of these.     The use of Dragon/[employer] services may have been used to construct the content in this note; any grammatical or spelling errors are non-intentional. Please contact the author of this note directly if you are in need of any clarification.      s          Patient Instructions     Start first dose today and then every Friday weekly for 4 weeks total    Continue clotrimazole 2 times a day during time taking the diflucan    Follow up as needed or if your symptoms worsen in any way.     Follow up with your primary care provider or clinic  if your symptoms do not improve     Juju Rosario MD

## 2022-10-21 NOTE — PATIENT INSTRUCTIONS
Start first dose today and then every Friday weekly for 4 weeks total    Continue clotrimazole 2 times a day during time taking the diflucan    Follow up as needed or if your symptoms worsen in any way.     Follow up with your primary care provider or clinic  if your symptoms do not improve     Juju Rosario MD

## 2022-11-20 ENCOUNTER — HEALTH MAINTENANCE LETTER (OUTPATIENT)
Age: 22
End: 2022-11-20

## 2023-01-13 NOTE — TELEPHONE ENCOUNTER
DIAGNOSIS: An imbalance in the way my shoulders move, while exercising. Pain.   APPOINTMENT DATE: 1.20.23-right   NOTES STATUS DETAILS   OFFICE NOTE from other specialist Internal 7.19.21 Catherine Mccallum MD   MEDICATION LIST Internal    XRAYS (IMAGES & REPORTS) Internal 7.19.21 R shoulder

## 2023-01-20 ENCOUNTER — OFFICE VISIT (OUTPATIENT)
Dept: ORTHOPEDICS | Facility: CLINIC | Age: 23
End: 2023-01-20
Payer: COMMERCIAL

## 2023-01-20 ENCOUNTER — PRE VISIT (OUTPATIENT)
Dept: ORTHOPEDICS | Facility: CLINIC | Age: 23
End: 2023-01-20

## 2023-01-20 DIAGNOSIS — G25.89 SCAPULAR DYSKINESIS: Primary | ICD-10-CM

## 2023-01-20 PROCEDURE — 99213 OFFICE O/P EST LOW 20 MIN: CPT | Performed by: FAMILY MEDICINE

## 2023-01-20 NOTE — LETTER
Date:January 23, 2023      Patient was self referred, no letter generated. Do not send.        [facility] Health Information

## 2023-01-20 NOTE — LETTER
1/20/2023      RE: Felice Winston  [address] [place] [address]     Dear Colleague,    Thank you for referring your patient, Felice Winston, to the [facility] SPORTS MEDICINE CLINIC [place]. Please see a copy of my visit note below.    CHIEF COMPLAINT:  Pain of the Right Shoulder       HISTORY OF PRESENT ILLNESS  Mr. Winston is a pleasant 23 year old year old male who presents to clinic today with right shoulder pain.  Felice explains he suffered an AC sprain to his right shoulder 2021.  He is also had an arthroscopic labral repair to his left shoulder about 5 years ago.    Since that time he has noticed increasing asymmetry of his shoulders with activities that require symmetric motion such as bench press, push-ups he is bothered by this.  There is mild anterior shoulder pain on the right that accompanies this.  He is wondering if there is some intrinsic weakness of the shoulder.  No new injury to the shoulder.  He did not undergo physical therapy after his last AC sprain.    Onset: gradual  Location: right shoulder  Quality:  shooting  Duration: 5 months    Severity: 4/10 at worst  Timing:intermittent episodes   Modifying factors:  resting and non-use makes it better, movement and use makes it worse  Associated signs & symptoms: pain  Previous similar pain: Yes, Left, Tore Labrum and had surgery 5 years ago   Treatments to date:NA     Additional history: as documented    Review of Systems:    Have you recently had a a fever, chills, weight loss? No    Do you have any vision problems? No    Do you have any chest pain or edema? No    Do you have any shortness of breath or wheezing?  No    Do you have stomach problems? No    Do you have any numbness or focal weakness? No    Do you have diabetes? No    Do you have problems with bleeding or clotting? No    Do you have an rashes or other skin lesions? No    MEDICAL HISTORY  There is no problem list on file for this patient.      Current Outpatient  Medications   Medication Sig Dispense Refill     ISOtretinoin (ACCUTANE) 40 MG capsule Take 2 capsules (80 mg) by mouth daily with food 60 capsule 0     PARoxetine (PAXIL) 10 MG tablet          Allergies   Allergen Reactions     Seasonal Allergies Unknown     Sulfa Drugs Itching       No family history on file.    Additional medical/Social/Surgical histories reviewed in [facility] and updated as appropriate.       PHYSICAL EXAM  There were no vitals taken for this visit.    General  - normal appearance, in no obvious distress  Musculoskeletal - Right shoulder  - inspection: normal bone and joint alignment, scapular dyskinesis seen between shoulder blades when performing wall push-up and with forward elevation/abduction.  - palpation: no bony or soft tissue tenderness, normal clavicle, non-tender AC  - ROM:  170 deg flexion   45 deg extension   150 deg abduction   90 deg ER   T5 IR  - strength: 5/5  strength, 5/5 in all shoulder planes  - special tests:  (-) Speed's  (-) Neer  (-) Hawkin's  (-) Joshua  (-) Bulloch's  (-) apprehension  (-) subscap lift-off  Neuro  - no sensory or motor deficit, grossly normal coordination, normal muscle tone    IMAGING : XR Right shoulder 3 views 2021.    Examination:  XR SHOULDER RIGHT G/E 3 VIEWS     Date:  7/19/2021 5:11 PM      Clinical Information: Right shoulder pain     Comparison: none.                                                                      Impression:     1.  Right shoulder negative for fracture or dislocation.     2.  Possible soft tissue swelling over the AC joint, which is normally  aligned. Correlation recommended with pain and tenderness in this  location, which could be seen with a grade 1 AC joint sprain.     TAO FINNEY MD      ASSESSMENT & PLAN  Mr. Winston is a 23 year old year old male who presents to clinic today with right shoulder pain and asymmetry.  Suspect asymmetry related to multiple shoulder injuries over the past 5 years, and associated  developing dyskinesis.    Diagnosis: Scapular dyskinesis    Treatment options discussed at this time he largely has normal examination except for asymmetry of scapular motion.  I have recommended we start with formal physical therapy to work through both shoulders.  I recommended he see Mukund Carlos at [facility] location given his interest in pursuing weightlifting and his close proximity to this clinic.    Shoulder pain persists, we may consider MRI of his right shoulder to evaluate whether there is an underlying labrum tear or other pathology present, although no pain with provocative labral testing today.    It was a pleasure seeing Will today.    Nitish Anna DO, [facility]  Primary Care Sports Medicine      Again, thank you for allowing me to participate in the care of your patient.      Sincerely,    Nitish Anna DO

## 2023-01-20 NOTE — PROGRESS NOTES
CHIEF COMPLAINT:  Pain of the Right Shoulder       HISTORY OF PRESENT ILLNESS  Mr. Winston is a pleasant 23 year old year old male who presents to clinic today with right shoulder pain.  Will explains he suffered an AC sprain to his right shoulder 2021.  He is also had an arthroscopic labral repair to his left shoulder about 5 years ago.    Since that time he has noticed increasing asymmetry of his shoulders with activities that require symmetric motion such as bench press, push-ups he is bothered by this.  There is mild anterior shoulder pain on the right that accompanies this.  He is wondering if there is some intrinsic weakness of the shoulder.  No new injury to the shoulder.  He did not undergo physical therapy after his last AC sprain.    Onset: gradual  Location: right shoulder  Quality:  shooting  Duration: 5 months    Severity: 4/10 at worst  Timing:intermittent episodes   Modifying factors:  resting and non-use makes it better, movement and use makes it worse  Associated signs & symptoms: pain  Previous similar pain: Yes, Left, Tore Labrum and had surgery 5 years ago   Treatments to date:NA     Additional history: as documented    Review of Systems:    Have you recently had a a fever, chills, weight loss? No    Do you have any vision problems? No    Do you have any chest pain or edema? No    Do you have any shortness of breath or wheezing?  No    Do you have stomach problems? No    Do you have any numbness or focal weakness? No    Do you have diabetes? No    Do you have problems with bleeding or clotting? No    Do you have an rashes or other skin lesions? No    MEDICAL HISTORY  There is no problem list on file for this patient.      Current Outpatient Medications   Medication Sig Dispense Refill     ISOtretinoin (ACCUTANE) 40 MG capsule Take 2 capsules (80 mg) by mouth daily with food 60 capsule 0     PARoxetine (PAXIL) 10 MG tablet          Allergies   Allergen Reactions     Seasonal Allergies Unknown      Sulfa Drugs Itching       No family history on file.    Additional medical/Social/Surgical histories reviewed in EPIC and updated as appropriate.       PHYSICAL EXAM  There were no vitals taken for this visit.    General  - normal appearance, in no obvious distress  Musculoskeletal - Right shoulder  - inspection: normal bone and joint alignment, scapular dyskinesis seen between shoulder blades when performing wall push-up and with forward elevation/abduction.  - palpation: no bony or soft tissue tenderness, normal clavicle, non-tender AC  - ROM:  170 deg flexion   45 deg extension   150 deg abduction   90 deg ER   T5 IR  - strength: 5/5  strength, 5/5 in all shoulder planes  - special tests:  (-) Speed's  (-) Neer  (-) Hawkin's  (-) Joshua  (-) [facility]  (-) apprehension  (-) subscap lift-off  Neuro  - no sensory or motor deficit, grossly normal coordination, normal muscle tone    IMAGING : XR Right shoulder 3 views 2021.    Examination:  XR SHOULDER RIGHT G/E 3 VIEWS     Date:  7/19/2021 5:11 PM      Clinical Information: Right shoulder pain     Comparison: none.                                                                      Impression:     1.  Right shoulder negative for fracture or dislocation.     2.  Possible soft tissue swelling over the AC joint, which is normally  aligned. Correlation recommended with pain and tenderness in this  location, which could be seen with a grade 1 AC joint sprain.     TAO FINNEY MD      ASSESSMENT & PLAN  Mr. Winston is a 23 year old year old male who presents to clinic today with right shoulder pain and asymmetry.  Suspect asymmetry related to multiple shoulder injuries over the past 5 years, and associated developing dyskinesis.    Diagnosis: Scapular dyskinesis    Treatment options discussed at this time he largely has normal examination except for asymmetry of scapular motion.  I have recommended we start with formal physical therapy to work through both  shoulders.  I recommended he see [facility]und [facility] at [facility] given his interest in pursuing weightlifting and his close proximity to this clinic.    Shoulder pain persists, we may consider MRI of his right shoulder to evaluate whether there is an underlying labrum tear or other pathology present, although no pain with provocative labral testing today.    It was a pleasure seeing Will today.    Nitish Anna DO, [facility]  Primary Care Sports Medicine

## 2023-01-23 ENCOUNTER — THERAPY VISIT (OUTPATIENT)
Dept: PHYSICAL THERAPY | Facility: CLINIC | Age: 23
End: 2023-01-23
Payer: COMMERCIAL

## 2023-01-23 DIAGNOSIS — G25.89 SCAPULAR DYSKINESIS: ICD-10-CM

## 2023-01-23 PROCEDURE — 97161 PT EVAL LOW COMPLEX 20 MIN: CPT | Mod: GP | Performed by: PHYSICAL THERAPIST

## 2023-01-23 PROCEDURE — 97530 THERAPEUTIC ACTIVITIES: CPT | Mod: GP | Performed by: PHYSICAL THERAPIST

## 2023-01-23 PROCEDURE — 97110 THERAPEUTIC EXERCISES: CPT | Mod: GP | Performed by: PHYSICAL THERAPIST

## 2023-01-23 NOTE — PROGRESS NOTES
Physical Therapy Initial Examination/Evaluation  January 23, 2023    Felice Winston is a 23 year old male referred to physical therapy by Dr. Anna for treatment of     Diagnosis:  R shoulder pain/scapular dyskinesis  Precautions/Restrictions/MD instructions/Other pertinent hx E&T    Therapist Impression:   Felice presents to physical therapy with symptoms consistent with the above diagnosis.  We will focus on scapular retraining    GOALS: weight training    NEXT: 90-90 ER, Y    PTRX: handouts    Subjective:  DOI/onset: July 2022 DOS: none  Acute Injury or Gradual Onset?: Gradual injury over time  Mechanism of Injury: Lifting weights  Related PMH: L labral repair and R AC join injury Previous Treatment: Rest and PT Effect of prior treatment: good  Imaging: None  Chief Complaint/Functional Limitations:   Pushing exercises and see below in therapy evaluation codes   Pain: rest 0 /10, activity 6/10 Location: anterior Frequency: Intermittent Described as: shooting Alleviated by: Nothing Progression of Symptoms: Gradually getting worse. Time of day when pain is worse: Activity related  Sleeping: No issues/uninterrupted   Occupation: Student  Job duties: keyboarding/computer use, lifting/carrying  Current HEP/exercise regimen: General strength training and hockey  Patient's goals are see chief complaints     Other pertinent PMH/Red Flags: None   Barriers at home/work: None as reported by patient  Pertinent Surgical History: L shoulder labral repair 5 years ago  Medications: Anti-depressants  General health as reported by patient: excellent  Return to MD:  Prn    SHOULDER EXAMINATION  Handedness: Right    STATIC POSTURE  Forward head: mild   Rounded shoulders:moderate  Shoulder internally rotated: moderate   Visual inspection: Elevated scapula R    DYNAMIC SCAPULAR TESTS  Dynamic Scapular Assessment: Scapular winging R and Poor eccentric control R    SHOULDER RANGE OF MOTION  WNL    SHOULDER STRENGTH  MMT Right Left   Flexion  5/5 5/5   Abduction 5/5 5/5   ER 5/5 5/5   IR 5/5 5/5   P= pain    SPECIAL TESTS    PALPATION  Left: Not assessed  Right: Not assessed    Assessment/Plan:  Patient is a 23 year old male with right side shoulder complaints.    Patient has the following significant findings with corresponding treatment plan.                Diagnosis 1:  R shoulder pain/scapular dyskinesis  Pain -  hot/cold therapy, manual therapy, splint/taping/bracing/orthotics, self management, education and home program  Impaired muscle performance - neuro re-education  Impaired posture - neuro re-education    Therapy Evaluation Codes:   Cumulative Therapy Evaluation is: Low complexity.    Previous and current functional limitations:  (See Goal Flow Sheet for this information)    Short term and Long term goals: (See Goal Flow Sheet for this information)     Communication ability:  Patient appears to be able to clearly communicate and understand verbal and written communication and follow directions correctly.  Treatment Explanation - The following has been discussed with the patient:   RX ordered/plan of care  Anticipated outcomes  Possible risks and side effects  This patient would benefit from PT intervention to resume normal activities.   Rehab potential is good.    Frequency:  1 X week, once daily for 2 weeks, progressing to 2 x month  Duration:  for 3 months  Discharge Plan:  Achieve all LTG.  Independent in home treatment program.  Reach maximal therapeutic benefit.    Please refer to the daily flowsheet for treatment today, total treatment time and time spent performing 1:1 timed codes.

## 2023-04-15 ENCOUNTER — HEALTH MAINTENANCE LETTER (OUTPATIENT)
Age: 23
End: 2023-04-15

## 2024-06-22 ENCOUNTER — HEALTH MAINTENANCE LETTER (OUTPATIENT)
Age: 24
End: 2024-06-22

## 2025-07-12 ENCOUNTER — HEALTH MAINTENANCE LETTER (OUTPATIENT)
Age: 25
End: 2025-07-12